# Patient Record
Sex: MALE | Race: WHITE
[De-identification: names, ages, dates, MRNs, and addresses within clinical notes are randomized per-mention and may not be internally consistent; named-entity substitution may affect disease eponyms.]

---

## 2018-01-15 ENCOUNTER — CHARTING TRANS (OUTPATIENT)
Dept: OTHER | Age: 38
End: 2018-01-15

## 2018-09-28 ENCOUNTER — CHARTING TRANS (OUTPATIENT)
Dept: OTHER | Age: 38
End: 2018-09-28

## 2018-11-02 VITALS
RESPIRATION RATE: 18 BRPM | WEIGHT: 245 LBS | OXYGEN SATURATION: 94 % | BODY MASS INDEX: 33.18 KG/M2 | DIASTOLIC BLOOD PRESSURE: 90 MMHG | HEIGHT: 72 IN | HEART RATE: 104 BPM | TEMPERATURE: 98.1 F | SYSTOLIC BLOOD PRESSURE: 130 MMHG

## 2018-11-12 ENCOUNTER — OFFICE VISIT (OUTPATIENT)
Dept: INTERNAL MEDICINE CLINIC | Facility: CLINIC | Age: 38
End: 2018-11-12
Payer: COMMERCIAL

## 2018-11-12 VITALS
SYSTOLIC BLOOD PRESSURE: 142 MMHG | RESPIRATION RATE: 16 BRPM | HEART RATE: 88 BPM | WEIGHT: 256 LBS | BODY MASS INDEX: 34.67 KG/M2 | DIASTOLIC BLOOD PRESSURE: 98 MMHG | HEIGHT: 72 IN | TEMPERATURE: 98 F

## 2018-11-12 DIAGNOSIS — Z00.00 PHYSICAL EXAM, ANNUAL: ICD-10-CM

## 2018-11-12 DIAGNOSIS — R22.31 MASS OF ARM, RIGHT: ICD-10-CM

## 2018-11-12 DIAGNOSIS — R42 VERTIGO: ICD-10-CM

## 2018-11-12 DIAGNOSIS — R21 RASH: Primary | ICD-10-CM

## 2018-11-12 PROCEDURE — 99203 OFFICE O/P NEW LOW 30 MIN: CPT | Performed by: INTERNAL MEDICINE

## 2018-11-12 RX ORDER — CLOTRIMAZOLE AND BETAMETHASONE DIPROPIONATE 10; .64 MG/G; MG/G
1 CREAM TOPICAL 2 TIMES DAILY
Qty: 45 G | Refills: 0 | Status: SHIPPED | OUTPATIENT
Start: 2018-11-12 | End: 2019-05-13
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
## 2018-11-27 VITALS
OXYGEN SATURATION: 97 % | RESPIRATION RATE: 18 BRPM | SYSTOLIC BLOOD PRESSURE: 130 MMHG | HEIGHT: 72 IN | HEART RATE: 84 BPM | TEMPERATURE: 98.5 F | DIASTOLIC BLOOD PRESSURE: 90 MMHG | BODY MASS INDEX: 33.86 KG/M2 | WEIGHT: 250 LBS

## 2018-11-29 ENCOUNTER — TELEPHONE (OUTPATIENT)
Dept: INTERNAL MEDICINE CLINIC | Facility: CLINIC | Age: 38
End: 2018-11-29

## 2018-11-29 NOTE — TELEPHONE ENCOUNTER
Patient coming in for appointment Monday, will need to get Dr. Daniella Wilkes information then. Is aware Dr. Ly Cazares was not in his network.

## 2018-12-03 ENCOUNTER — OFFICE VISIT (OUTPATIENT)
Dept: INTERNAL MEDICINE CLINIC | Facility: CLINIC | Age: 38
End: 2018-12-03
Payer: COMMERCIAL

## 2018-12-03 VITALS
RESPIRATION RATE: 16 BRPM | HEART RATE: 90 BPM | TEMPERATURE: 98 F | WEIGHT: 256.19 LBS | SYSTOLIC BLOOD PRESSURE: 148 MMHG | BODY MASS INDEX: 34.7 KG/M2 | DIASTOLIC BLOOD PRESSURE: 96 MMHG | OXYGEN SATURATION: 96 % | HEIGHT: 72 IN

## 2018-12-03 DIAGNOSIS — Z11.3 SCREENING FOR STD (SEXUALLY TRANSMITTED DISEASE): ICD-10-CM

## 2018-12-03 DIAGNOSIS — R03.0 ELEVATED BLOOD PRESSURE READING: ICD-10-CM

## 2018-12-03 DIAGNOSIS — Z00.00 PHYSICAL EXAM, ANNUAL: Primary | ICD-10-CM

## 2018-12-03 PROCEDURE — 99395 PREV VISIT EST AGE 18-39: CPT | Performed by: INTERNAL MEDICINE

## 2018-12-03 RX ORDER — HYDROCHLOROTHIAZIDE 25 MG/1
25 TABLET ORAL DAILY
Qty: 30 TABLET | Refills: 1 | Status: SHIPPED | OUTPATIENT
Start: 2018-12-03 | End: 2019-01-07

## 2018-12-03 NOTE — PROGRESS NOTES
[facility]    CHIEF COMPLAINT: Patient presents with:  Physical: Patient wanting to go over bloodwork results, Overall feels well colonoscopy-no record of          HPI:   Bj Newman is a 45year old male who presents for a complete physi pain,denies heartburn  : denies dysuria, nocturia, no penile discharge   MUSCULOSKELETAL: denies back pain  NEURO: denies headaches  PSYCHE: denies depression or anxiety  HEMATOLOGIC: denies hx of anemia  ENDOCRINE: denies thyroid history  ALL/ASTHMA: de Component Value Date/Time    A1C 5.5 11/14/2018 10:11 AM      Vitamin D:    No results found for: VITD        ASSESSMENT AND PLAN:   Teresita Tovar is a 45year old male who presents for a complete physical exam.   1. Physical exam, annual  Labs rev

## 2018-12-14 ENCOUNTER — WALK IN (OUTPATIENT)
Dept: URGENT CARE | Age: 38
End: 2018-12-14

## 2018-12-14 VITALS
OXYGEN SATURATION: 95 % | SYSTOLIC BLOOD PRESSURE: 146 MMHG | BODY MASS INDEX: 33.86 KG/M2 | HEART RATE: 93 BPM | HEIGHT: 72 IN | DIASTOLIC BLOOD PRESSURE: 80 MMHG | WEIGHT: 250 LBS | RESPIRATION RATE: 18 BRPM | TEMPERATURE: 98.8 F

## 2018-12-14 DIAGNOSIS — J06.9 VIRAL UPPER RESPIRATORY TRACT INFECTION: Primary | ICD-10-CM

## 2018-12-14 LAB
INTERNAL PROCEDURAL CONTROLS ACCEPTABLE: YES
S PYO AG THROAT QL IA.RAPID: NEGATIVE

## 2018-12-14 PROCEDURE — 87880 STREP A ASSAY W/OPTIC: CPT | Performed by: NURSE PRACTITIONER

## 2018-12-14 PROCEDURE — 99203 OFFICE O/P NEW LOW 30 MIN: CPT | Performed by: NURSE PRACTITIONER

## 2018-12-14 RX ORDER — HYDROCHLOROTHIAZIDE 25 MG/1
25 TABLET ORAL DAILY
COMMUNITY

## 2018-12-14 ASSESSMENT — ENCOUNTER SYMPTOMS
SWOLLEN GLANDS: 1
GASTROINTESTINAL NEGATIVE: 1
SHORTNESS OF BREATH: 0
WHEEZING: 0
ENDOCRINE NEGATIVE: 1
SORE THROAT: 1
TROUBLE SWALLOWING: 0
ALLERGIC/IMMUNOLOGIC NEGATIVE: 1
NEUROLOGICAL NEGATIVE: 1
PSYCHIATRIC NEGATIVE: 1
HEMATOLOGIC/LYMPHATIC NEGATIVE: 1
CONSTITUTIONAL NEGATIVE: 1
COUGH: 1
EYE REDNESS: 1

## 2018-12-17 ENCOUNTER — HOSPITAL ENCOUNTER (OUTPATIENT)
Age: 38
Discharge: HOME | End: 2018-12-17
Attending: FAMILY MEDICINE
Payer: COMMERCIAL

## 2018-12-17 VITALS
HEART RATE: 88 BPM | SYSTOLIC BLOOD PRESSURE: 137 MMHG | RESPIRATION RATE: 20 BRPM | DIASTOLIC BLOOD PRESSURE: 93 MMHG | TEMPERATURE: 98 F | OXYGEN SATURATION: 94 %

## 2018-12-17 DIAGNOSIS — J20.9 BRONCHITIS WITH BRONCHOSPASM: Primary | ICD-10-CM

## 2018-12-17 PROCEDURE — 99214 OFFICE O/P EST MOD 30 MIN: CPT

## 2018-12-17 PROCEDURE — 99204 OFFICE O/P NEW MOD 45 MIN: CPT

## 2018-12-17 PROCEDURE — 94664 DEMO&/EVAL PT USE INHALER: CPT

## 2018-12-17 PROCEDURE — 94640 AIRWAY INHALATION TREATMENT: CPT

## 2018-12-17 RX ORDER — AZITHROMYCIN 250 MG/1
TABLET, FILM COATED ORAL
Qty: 1 PACKAGE | Refills: 0 | Status: SHIPPED | OUTPATIENT
Start: 2018-12-17 | End: 2018-12-22

## 2018-12-17 RX ORDER — METHYLPREDNISOLONE 4 MG/1
TABLET ORAL
Qty: 1 PACKAGE | Refills: 0 | Status: SHIPPED | OUTPATIENT
Start: 2018-12-17 | End: 2018-12-23

## 2018-12-17 RX ORDER — IPRATROPIUM BROMIDE AND ALBUTEROL SULFATE 2.5; .5 MG/3ML; MG/3ML
3 SOLUTION RESPIRATORY (INHALATION) ONCE
Status: COMPLETED | OUTPATIENT
Start: 2018-12-17 | End: 2018-12-17

## 2018-12-17 RX ORDER — ALBUTEROL SULFATE 90 UG/1
2 AEROSOL, METERED RESPIRATORY (INHALATION) EVERY 4 HOURS PRN
Qty: 1 INHALER | Refills: 0 | Status: SHIPPED | OUTPATIENT
Start: 2018-12-17 | End: 2019-01-07

## 2018-12-17 RX ORDER — BENZONATATE 100 MG/1
100 CAPSULE ORAL 3 TIMES DAILY PRN
Qty: 15 CAPSULE | Refills: 0 | Status: SHIPPED | OUTPATIENT
Start: 2018-12-17 | End: 2018-12-22

## 2018-12-17 NOTE — ED PROVIDER NOTES
Patient Seen in: [facility]    History   Patient presents with:  Cough/URI  Redness    Stated Complaint: coughing red eyes nose congestion     HPI  This is a 43-year-old male coming in with complaints of swollen glands, nasal co HEAD: Normocephalic, atraumatic  EENT: OP - wnl, moist, TMs - middle ear effusion +,  Nares normal  NECK: FROM, supple, anterior cervical LAD +   LUNGS: Diminished breath sounds over the lower lung fields, good effort but not as much movement of air as e of this use fragrance free vaseline to apply over the nasal septal mucosa)  · Daily antihistamine - Claritin OR Zyrtec in the AM (non-drowsy) and take Benadryl 25 mg at night time  · Avoid Sudafed (pseudoephedrine or phenylephrine) if you have heart proble Wheezing., Normal, Disp-1 Inhaler, R-0    benzonatate (TESSALON PERLES) 100 MG Oral Cap  Take 1 capsule (100 mg total) by mouth 3 (three) times daily as needed for cough., Normal, Disp-15 capsule, R-0    azithromycin (ZITHROMAX Z-RIANNA) 250 MG Oral Tab  500
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
## 2019-01-07 ENCOUNTER — OFFICE VISIT (OUTPATIENT)
Dept: INTERNAL MEDICINE CLINIC | Facility: CLINIC | Age: 39
End: 2019-01-07
Payer: COMMERCIAL

## 2019-01-07 ENCOUNTER — TELEPHONE (OUTPATIENT)
Dept: INTERNAL MEDICINE CLINIC | Facility: CLINIC | Age: 39
End: 2019-01-07

## 2019-01-07 VITALS
RESPIRATION RATE: 12 BRPM | HEIGHT: 72 IN | TEMPERATURE: 98 F | DIASTOLIC BLOOD PRESSURE: 96 MMHG | SYSTOLIC BLOOD PRESSURE: 142 MMHG | HEART RATE: 101 BPM | BODY MASS INDEX: 33.95 KG/M2 | WEIGHT: 250.69 LBS

## 2019-01-07 DIAGNOSIS — R03.0 ELEVATED BLOOD PRESSURE READING: ICD-10-CM

## 2019-01-07 PROCEDURE — 99213 OFFICE O/P EST LOW 20 MIN: CPT | Performed by: INTERNAL MEDICINE

## 2019-01-07 RX ORDER — HYDROCHLOROTHIAZIDE 25 MG/1
25 TABLET ORAL DAILY
Qty: 30 TABLET | Refills: 0 | Status: SHIPPED | OUTPATIENT
Start: 2019-01-07 | End: 2019-02-11

## 2019-01-07 NOTE — TELEPHONE ENCOUNTER
Patient was just in office to see Dr. Carlos Manuel Cano. At check out patient asked that pharmacy be changed in system to one closer to his house. Patient would like the RX that was written today for hydrochlorothiazide be sent to the [facility] in [place] [address]  Wendi Ferro

## 2019-01-07 NOTE — TELEPHONE ENCOUNTER
Called the patient and informed him that since the prescription is at another [facility], they can just transfer the prescription to the location where he wants to pick it up.  Verified with the patient that he would like his permanent pharmacy noemi

## 2019-01-07 NOTE — PROGRESS NOTES
[facility]    CHIEF COMPLAINT:  Patient presents with:  Blood Pressure        HISTORY OF PRESENT ILLNESS:  Here for bp check. bp still elevated here in office.  Readings at home are generally under 140/90 but only has a couple of readings to claire NEUTROPHILS 58.5 %    LYMPHOCYTES 30.2 %    MONOCYTES 8.8 %    EOSINOPHILS 1.5 %    BASOPHILS 1.0 %   COMP METABOLIC PANEL (14)   Result Value Ref Range    GLUCOSE 98 65 - 99 mg/dL    UREA NITROGEN (BUN) 14 7 - 25 mg/dL    CREATININE 0.80 0.60 - 1.35 mg/dL

## 2019-02-05 LAB
BUN: 14 MG/DL (ref 7–25)
CALCIUM: 9.5 MG/DL (ref 8.6–10.3)
CARBON DIOXIDE: 24 MMOL/L (ref 20–32)
CHLORIDE: 104 MMOL/L (ref 98–110)
CREATININE: 0.9 MG/DL (ref 0.6–1.35)
EGFR IF AFRICN AM: 125 ML/MIN/1.73M2
EGFR IF NONAFRICN AM: 108 ML/MIN/1.73M2
GLUCOSE: 96 MG/DL (ref 65–139)
POTASSIUM: 4.4 MMOL/L (ref 3.5–5.3)
SODIUM: 137 MMOL/L (ref 135–146)

## 2019-02-11 ENCOUNTER — OFFICE VISIT (OUTPATIENT)
Dept: INTERNAL MEDICINE CLINIC | Facility: CLINIC | Age: 39
End: 2019-02-11
Payer: COMMERCIAL

## 2019-02-11 VITALS
WEIGHT: 251.63 LBS | BODY MASS INDEX: 34.08 KG/M2 | HEIGHT: 72 IN | HEART RATE: 93 BPM | DIASTOLIC BLOOD PRESSURE: 92 MMHG | TEMPERATURE: 98 F | SYSTOLIC BLOOD PRESSURE: 130 MMHG

## 2019-02-11 DIAGNOSIS — R22.31 MASS OF ARM, RIGHT: ICD-10-CM

## 2019-02-11 DIAGNOSIS — R03.0 ELEVATED BLOOD PRESSURE READING: ICD-10-CM

## 2019-02-11 DIAGNOSIS — M54.9 UPPER BACK PAIN: ICD-10-CM

## 2019-02-11 PROCEDURE — 99214 OFFICE O/P EST MOD 30 MIN: CPT | Performed by: INTERNAL MEDICINE

## 2019-02-11 RX ORDER — HYDROCHLOROTHIAZIDE 25 MG/1
25 TABLET ORAL DAILY
Qty: 30 TABLET | Refills: 2 | Status: SHIPPED | OUTPATIENT
Start: 2019-02-11 | End: 2019-06-17

## 2019-02-11 NOTE — PROGRESS NOTES
[facility]    CHIEF COMPLAINT:  Patient presents with:  Blood Pressure        HISTORY OF PRESENT ILLNESS:  Here for bp check. bp is still elevated in the office. Has been under 130/80 at home when he checks it.  High readings on his cuff here to AMERICAN 125 > OR = 60 mL/min/1.73m2    BUN/CREATININE RATIO NOT APPLICABLE 6 - 22 (calc)    SODIUM 137 135 - 146 mmol/L    POTASSIUM 4.4 3.5 - 5.3 mmol/L    CHLORIDE 104 98 - 110 mmol/L    CARBON DIOXIDE 24 20 - 32 mmol/L    CALCIUM 9.5 8.6 - 10.3 mg/dL

## 2019-03-04 ENCOUNTER — OFFICE VISIT (OUTPATIENT)
Dept: SURGERY | Facility: CLINIC | Age: 39
End: 2019-03-04
Payer: COMMERCIAL

## 2019-03-04 VITALS
HEART RATE: 98 BPM | BODY MASS INDEX: 34 KG/M2 | HEIGHT: 72 IN | DIASTOLIC BLOOD PRESSURE: 87 MMHG | SYSTOLIC BLOOD PRESSURE: 145 MMHG | WEIGHT: 251 LBS

## 2019-03-04 DIAGNOSIS — D17.21 LIPOMA OF RIGHT UPPER EXTREMITY: Primary | ICD-10-CM

## 2019-03-04 PROCEDURE — 99243 OFF/OP CNSLTJ NEW/EST LOW 30: CPT | Performed by: SURGERY

## 2019-03-04 NOTE — H&P
New Patient Visit Note       Active Problems      1. Lipoma of right upper extremity        Chief Complaint   Patient presents with:  Lump: new pt. referred by Dr. Cira Mckeon - forearm lipoma. PT has had lipoma for a couple years.        History of Present Illnes 45 g, Rfl: 0      Review of Systems  The Review of Systems has been reviewed by me during today. Review of Systems   Constitutional: Negative for chills, diaphoresis, fatigue, fever and unexpected weight change.    HENT: Negative for hearing loss, noseblee Panel (14)      EKG 12 Lead      Imaging & Referrals   EKG 12-LEAD    Follow Up  No Follow-up on file.     Pinky Ortega MD

## 2019-03-06 ENCOUNTER — TELEPHONE (OUTPATIENT)
Dept: SURGERY | Facility: CLINIC | Age: 39
End: 2019-03-06

## 2019-05-13 ENCOUNTER — OFFICE VISIT (OUTPATIENT)
Dept: INTERNAL MEDICINE CLINIC | Facility: CLINIC | Age: 39
End: 2019-05-13
Payer: COMMERCIAL

## 2019-05-13 VITALS
BODY MASS INDEX: 33.98 KG/M2 | WEIGHT: 250.88 LBS | DIASTOLIC BLOOD PRESSURE: 98 MMHG | HEART RATE: 96 BPM | RESPIRATION RATE: 12 BRPM | TEMPERATURE: 97 F | HEIGHT: 72 IN | SYSTOLIC BLOOD PRESSURE: 140 MMHG

## 2019-05-13 DIAGNOSIS — I10 ESSENTIAL HYPERTENSION: Primary | ICD-10-CM

## 2019-05-13 PROCEDURE — 99213 OFFICE O/P EST LOW 20 MIN: CPT | Performed by: INTERNAL MEDICINE

## 2019-05-13 RX ORDER — LISINOPRIL 5 MG/1
5 TABLET ORAL DAILY
Qty: 30 TABLET | Refills: 0 | Status: SHIPPED | OUTPATIENT
Start: 2019-05-13 | End: 2019-06-03

## 2019-05-13 NOTE — PROGRESS NOTES
[facility]    CHIEF COMPLAINT:  Patient presents with:  Blood Pressure        HISTORY OF PRESENT ILLNESS:  Here for follow up and bp check. bp still elevated. Reading at home today was 125/74. Taking hctz regularly.  Did not bring in any other r cuff and readings to next visit. - lisinopril 5 MG Oral Tab; Take 1 tablet (5 mg total) by mouth daily. Dispense: 30 tablet; Refill: 0        No orders of the defined types were placed in this encounter. Return to clinic in 2 weeks for bp check.

## 2019-06-03 ENCOUNTER — OFFICE VISIT (OUTPATIENT)
Dept: INTERNAL MEDICINE CLINIC | Facility: CLINIC | Age: 39
End: 2019-06-03
Payer: COMMERCIAL

## 2019-06-03 VITALS
TEMPERATURE: 98 F | SYSTOLIC BLOOD PRESSURE: 148 MMHG | RESPIRATION RATE: 12 BRPM | WEIGHT: 249 LBS | HEIGHT: 72 IN | BODY MASS INDEX: 33.72 KG/M2 | DIASTOLIC BLOOD PRESSURE: 92 MMHG | HEART RATE: 96 BPM

## 2019-06-03 DIAGNOSIS — I10 ESSENTIAL HYPERTENSION: ICD-10-CM

## 2019-06-03 PROCEDURE — 99213 OFFICE O/P EST LOW 20 MIN: CPT | Performed by: INTERNAL MEDICINE

## 2019-06-03 RX ORDER — LISINOPRIL 10 MG/1
10 TABLET ORAL DAILY
Qty: 30 TABLET | Refills: 0 | Status: SHIPPED | OUTPATIENT
Start: 2019-06-03 | End: 2019-06-17

## 2019-06-03 NOTE — PROGRESS NOTES
[facility]    CHIEF COMPLAINT:  Patient presents with:  Blood Pressure        HISTORY OF PRESENT ILLNESS:  Here for bp check. Htn: on hctz and lisinopril. Tolerating well. Reading in office is still elevated. Readings at home are 125-135/75-85. study. He does not feel well rested in the am but denies daytime fatigue or somnolence. Does not know if he snores. Has a relatively smaller airway. - lisinopril 10 MG Oral Tab; Take 1 tablet (10 mg total) by mouth daily. Dispense: 30 tablet;  Refill

## 2019-06-17 ENCOUNTER — OFFICE VISIT (OUTPATIENT)
Dept: INTERNAL MEDICINE CLINIC | Facility: CLINIC | Age: 39
End: 2019-06-17
Payer: COMMERCIAL

## 2019-06-17 VITALS
HEART RATE: 99 BPM | WEIGHT: 250.5 LBS | BODY MASS INDEX: 33.93 KG/M2 | DIASTOLIC BLOOD PRESSURE: 84 MMHG | TEMPERATURE: 98 F | RESPIRATION RATE: 12 BRPM | HEIGHT: 72 IN | SYSTOLIC BLOOD PRESSURE: 130 MMHG

## 2019-06-17 DIAGNOSIS — I10 ESSENTIAL HYPERTENSION: ICD-10-CM

## 2019-06-17 DIAGNOSIS — E78.2 MIXED HYPERLIPIDEMIA: Primary | ICD-10-CM

## 2019-06-17 PROCEDURE — 99213 OFFICE O/P EST LOW 20 MIN: CPT | Performed by: INTERNAL MEDICINE

## 2019-06-17 RX ORDER — LISINOPRIL 10 MG/1
10 TABLET ORAL DAILY
Qty: 30 TABLET | Refills: 2 | Status: SHIPPED | OUTPATIENT
Start: 2019-06-17 | End: 2020-03-27 | Stop reason: SINTOL

## 2019-06-17 RX ORDER — HYDROCHLOROTHIAZIDE 25 MG/1
25 TABLET ORAL DAILY
Qty: 30 TABLET | Refills: 2 | Status: SHIPPED | OUTPATIENT
Start: 2019-06-17 | End: 2020-03-27

## 2019-06-17 NOTE — PROGRESS NOTES
[facility]    CHIEF COMPLAINT:  Patient presents with:  Blood Pressure        HISTORY OF PRESENT ILLNESS:  Here for bp check. bp at home is well controlled on this regimen. 110-120/70-80. No chest pain, no shortness of breath.  No lightheadednes hypertension  Continue hctz and lisinopril. Will check sleep study. - hydrochlorothiazide 25 MG Oral Tab; Take 1 tablet (25 mg total) by mouth daily. Dispense: 30 tablet;  Refill: 2  - OP REFERRAL TO DIAGNOSTIC SLEEP STUDY  - GENERAL SLEEP STUDY TRANS

## 2020-03-27 ENCOUNTER — TELEPHONE (OUTPATIENT)
Dept: INTERNAL MEDICINE CLINIC | Facility: CLINIC | Age: 40
End: 2020-03-27

## 2020-03-27 DIAGNOSIS — Z13.29 SCREENING FOR THYROID DISORDER: Primary | ICD-10-CM

## 2020-03-27 DIAGNOSIS — Z13.0 SCREENING FOR BLOOD DISEASE: ICD-10-CM

## 2020-03-27 DIAGNOSIS — E78.2 MIXED HYPERLIPIDEMIA: ICD-10-CM

## 2020-03-27 NOTE — TELEPHONE ENCOUNTER
Labs re-ordered to [facility].  TS completed. Pt advised to call in advance if develops any symptoms or comes in contact with possibly infected individual. Advised of door protocol on entry.

## 2020-03-27 NOTE — TELEPHONE ENCOUNTER
I would like him to do a cbc, cmp, lipid, tsh. As long as his screening questions are negative he can see me on Monday for bp check. Make appt in the am after 9am. Do labs prior to visit.

## 2020-03-27 NOTE — TELEPHONE ENCOUNTER
Spoke with pt. Pt did not follow up due to needing to catch up on medical bills. Was taking medication but starting coughing persistently, making it hard to work. He stopped 1 med, coughing persisted, so stopped the other.  He did not recall the order i

## 2020-03-27 NOTE — TELEPHONE ENCOUNTER
Pt has appt scheduled for 3/30/2020. He has not done labs ordered in 6/2019. He did not come back in 2 weeks for bp check as we had asked him to. Medications have not been refilled since that time and so he is likely not taking his medications regularly.

## 2020-03-29 LAB
ABSOLUTE BASOPHILS: 67 CELLS/UL (ref 0–200)
ABSOLUTE EOSINOPHILS: 107 CELLS/UL (ref 15–500)
ABSOLUTE LYMPHOCYTES: 1802 CELLS/UL (ref 850–3900)
ABSOLUTE MONOCYTES: 570 CELLS/UL (ref 200–950)
ABSOLUTE NEUTROPHILS: 4154 CELLS/UL (ref 1500–7800)
ALBUMIN/GLOBULIN RATIO: 1.4 (CALC) (ref 1–2.5)
ALBUMIN: 4.3 G/DL (ref 3.6–5.1)
ALKALINE PHOSPHATASE: 74 U/L (ref 36–130)
ALT: 44 U/L (ref 9–46)
AST: 23 U/L (ref 10–40)
BASOPHILS: 1 %
BILIRUBIN, TOTAL: 0.6 MG/DL (ref 0.2–1.2)
BUN: 16 MG/DL (ref 7–25)
CALCIUM: 9.4 MG/DL (ref 8.6–10.3)
CARBON DIOXIDE: 24 MMOL/L (ref 20–32)
CHLORIDE: 105 MMOL/L (ref 98–110)
CHOL/HDLC RATIO: 5.3 (CALC)
CHOLESTEROL, TOTAL: 255 MG/DL
CREATININE: 0.85 MG/DL (ref 0.6–1.35)
EGFR IF AFRICN AM: 127 ML/MIN/1.73M2
EGFR IF NONAFRICN AM: 110 ML/MIN/1.73M2
EOSINOPHILS: 1.6 %
GLOBULIN: 3.1 G/DL (CALC) (ref 1.9–3.7)
GLUCOSE: 97 MG/DL (ref 65–99)
HDL CHOLESTEROL: 48 MG/DL
HEMATOCRIT: 46.9 % (ref 38.5–50)
HEMOGLOBIN: 16.3 G/DL (ref 13.2–17.1)
LDL-CHOLESTEROL: 171 MG/DL (CALC)
LYMPHOCYTES: 26.9 %
MCH: 31.2 PG (ref 27–33)
MCHC: 34.8 G/DL (ref 32–36)
MCV: 89.8 FL (ref 80–100)
MONOCYTES: 8.5 %
MPV: 9 FL (ref 7.5–12.5)
NEUTROPHILS: 62 %
NON-HDL CHOLESTEROL: 207 MG/DL (CALC)
PLATELET COUNT: 309 THOUSAND/UL (ref 140–400)
POTASSIUM: 4.4 MMOL/L (ref 3.5–5.3)
PROTEIN, TOTAL: 7.4 G/DL (ref 6.1–8.1)
RDW: 12.3 % (ref 11–15)
RED BLOOD CELL COUNT: 5.22 MILLION/UL (ref 4.2–5.8)
SODIUM: 138 MMOL/L (ref 135–146)
TRIGLYCERIDES: 197 MG/DL
TSH W/REFLEX TO FT4: 2.01 MIU/L (ref 0.4–4.5)
WHITE BLOOD CELL COUNT: 6.7 THOUSAND/UL (ref 3.8–10.8)

## 2020-03-30 NOTE — TELEPHONE ENCOUNTER
FYI, patient cancelled 3/30/20 apt on my chart. Rescheduled apt on 4/20/20 for \"Test results\"  Please advise if needed, or FYI.

## 2020-03-30 NOTE — TELEPHONE ENCOUNTER
Noted. Pt cancelled appt and rescheduled for next month. 

## 2020-04-20 NOTE — TELEPHONE ENCOUNTER
Left msg for pt to call back. To schedule TV for BP and cholesterol. Encourage to keep appt.     Per Dr Mae Erath result note:  Notes recorded by Uzma Oviedo MD on 4/20/2020 at 8:47 AM CDT  Pt cancelled his appt on 3/30 and rescheduled for 4/20/2020 and th

## 2021-06-04 ENCOUNTER — TELEPHONE (OUTPATIENT)
Dept: INTERNAL MEDICINE CLINIC | Facility: CLINIC | Age: 41
End: 2021-06-04

## 2021-12-28 ENCOUNTER — IMMUNIZATION (OUTPATIENT)
Dept: LAB | Facility: HOSPITAL | Age: 41
End: 2021-12-28
Attending: EMERGENCY MEDICINE
Payer: COMMERCIAL

## 2021-12-28 DIAGNOSIS — Z23 NEED FOR VACCINATION: Primary | ICD-10-CM

## 2021-12-28 PROCEDURE — 0011A SARSCOV2 VAC 100MCG/0.5ML IM: CPT

## 2022-01-25 ENCOUNTER — IMMUNIZATION (OUTPATIENT)
Dept: LAB | Facility: HOSPITAL | Age: 42
End: 2022-01-25
Attending: EMERGENCY MEDICINE
Payer: COMMERCIAL

## 2022-01-25 DIAGNOSIS — Z23 NEED FOR VACCINATION: Primary | ICD-10-CM

## 2022-01-25 PROCEDURE — 0012A SARSCOV2 VAC 100MCG/0.5ML IM: CPT

## 2022-02-04 ENCOUNTER — HOSPITAL ENCOUNTER (EMERGENCY)
Facility: HOSPITAL | Age: 42
Discharge: HOME | End: 2022-02-04
Attending: EMERGENCY MEDICINE
Payer: COMMERCIAL

## 2022-02-04 ENCOUNTER — APPOINTMENT (OUTPATIENT)
Dept: CT IMAGING | Facility: HOSPITAL | Age: 42
End: 2022-02-04
Attending: EMERGENCY MEDICINE
Payer: COMMERCIAL

## 2022-02-04 VITALS
OXYGEN SATURATION: 99 % | RESPIRATION RATE: 18 BRPM | TEMPERATURE: 98 F | DIASTOLIC BLOOD PRESSURE: 89 MMHG | SYSTOLIC BLOOD PRESSURE: 162 MMHG | HEART RATE: 77 BPM

## 2022-02-04 VITALS
BODY MASS INDEX: 33.86 KG/M2 | OXYGEN SATURATION: 97 % | TEMPERATURE: 98 F | RESPIRATION RATE: 18 BRPM | SYSTOLIC BLOOD PRESSURE: 164 MMHG | WEIGHT: 250 LBS | HEART RATE: 93 BPM | HEIGHT: 72 IN | DIASTOLIC BLOOD PRESSURE: 105 MMHG

## 2022-02-04 DIAGNOSIS — M54.2 NECK PAIN: Primary | ICD-10-CM

## 2022-02-04 DIAGNOSIS — I10 ESSENTIAL HYPERTENSION: ICD-10-CM
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
## 2022-02-04 DIAGNOSIS — M54.2 NECK PAIN: ICD-10-CM
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
## 2022-02-04 PROCEDURE — 99285 EMERGENCY DEPT VISIT HI MDM: CPT

## 2022-02-04 PROCEDURE — 72125 CT NECK SPINE W/O DYE: CPT | Performed by: EMERGENCY MEDICINE

## 2022-02-04 PROCEDURE — 99283 EMERGENCY DEPT VISIT LOW MDM: CPT

## 2022-02-04 PROCEDURE — 99284 EMERGENCY DEPT VISIT MOD MDM: CPT

## 2022-02-04 RX ORDER — IBUPROFEN 600 MG/1
600 TABLET ORAL ONCE
Status: COMPLETED | OUTPATIENT
Start: 2022-02-04 | End: 2022-02-04

## 2022-02-04 RX ORDER — CYCLOBENZAPRINE HCL 10 MG
10 TABLET ORAL 3 TIMES DAILY PRN
Qty: 10 TABLET | Refills: 0 | Status: SHIPPED | OUTPATIENT
Start: 2022-02-04 | End: 2022-02-11

## 2022-02-04 RX ORDER — IBUPROFEN 600 MG/1
600 TABLET ORAL EVERY 8 HOURS PRN
Qty: 30 TABLET | Refills: 0 | Status: SHIPPED | OUTPATIENT
Start: 2022-02-04 | End: 2022-02-11
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
## 2022-02-28 ENCOUNTER — PATIENT OUTREACH (OUTPATIENT)
Dept: INTERNAL MEDICINE CLINIC | Facility: CLINIC | Age: 42
End: 2022-02-28

## 2022-03-09 ENCOUNTER — TELEPHONE (OUTPATIENT)
Dept: INTERNAL MEDICINE CLINIC | Facility: CLINIC | Age: 42
End: 2022-03-09

## 2022-04-07 NOTE — TELEPHONE ENCOUNTER
See TE dated 2/28/22. Sent message thru [address] 3rd contact attempt made forwarding to [facility] to mail letter.

## 2022-07-26 ENCOUNTER — OFFICE VISIT (OUTPATIENT)
Dept: INTERNAL MEDICINE CLINIC | Facility: CLINIC | Age: 42
End: 2022-07-26
Payer: COMMERCIAL

## 2022-07-26 VITALS
RESPIRATION RATE: 16 BRPM | TEMPERATURE: 97 F | DIASTOLIC BLOOD PRESSURE: 86 MMHG | HEIGHT: 71.75 IN | WEIGHT: 256.38 LBS | SYSTOLIC BLOOD PRESSURE: 150 MMHG | BODY MASS INDEX: 35.11 KG/M2 | HEART RATE: 94 BPM

## 2022-07-26 DIAGNOSIS — I10 ESSENTIAL HYPERTENSION: ICD-10-CM

## 2022-07-26 DIAGNOSIS — Z00.00 PHYSICAL EXAM, ANNUAL: Primary | ICD-10-CM

## 2022-07-26 DIAGNOSIS — R21 RASH: ICD-10-CM

## 2022-07-26 PROCEDURE — 3079F DIAST BP 80-89 MM HG: CPT | Performed by: INTERNAL MEDICINE

## 2022-07-26 PROCEDURE — 3008F BODY MASS INDEX DOCD: CPT | Performed by: INTERNAL MEDICINE

## 2022-07-26 PROCEDURE — 99214 OFFICE O/P EST MOD 30 MIN: CPT | Performed by: INTERNAL MEDICINE

## 2022-07-26 PROCEDURE — 99396 PREV VISIT EST AGE 40-64: CPT | Performed by: INTERNAL MEDICINE

## 2022-07-26 PROCEDURE — 3077F SYST BP >= 140 MM HG: CPT | Performed by: INTERNAL MEDICINE

## 2022-07-26 RX ORDER — HYDROCHLOROTHIAZIDE 25 MG/1
25 TABLET ORAL DAILY
Qty: 90 TABLET | Refills: 0 | Status: SHIPPED | OUTPATIENT
Start: 2022-07-26

## 2022-07-26 RX ORDER — CLOTRIMAZOLE AND BETAMETHASONE DIPROPIONATE 10; .64 MG/G; MG/G
1 CREAM TOPICAL 2 TIMES DAILY
Qty: 45 G | Refills: 0 | Status: SHIPPED | OUTPATIENT
Start: 2022-07-26

## 2022-07-26 RX ORDER — LISINOPRIL 10 MG/1
10 TABLET ORAL DAILY
Qty: 90 TABLET | Refills: 0 | Status: SHIPPED | OUTPATIENT
Start: 2022-07-26

## 2022-08-10 LAB
ABSOLUTE BASOPHILS: 51 CELLS/UL (ref 0–200)
ABSOLUTE EOSINOPHILS: 83 CELLS/UL (ref 15–500)
ABSOLUTE LYMPHOCYTES: 1958 CELLS/UL (ref 850–3900)
ABSOLUTE MONOCYTES: 621 CELLS/UL (ref 200–950)
ABSOLUTE NEUTROPHILS: 3686 CELLS/UL (ref 1500–7800)
ALBUMIN/GLOBULIN RATIO: 1.5 (CALC) (ref 1–2.5)
ALBUMIN: 4.5 G/DL (ref 3.6–5.1)
ALKALINE PHOSPHATASE: 76 U/L (ref 36–130)
ALT: 28 U/L (ref 9–46)
AST: 19 U/L (ref 10–40)
BASOPHILS: 0.8 %
BILIRUBIN, TOTAL: 0.5 MG/DL (ref 0.2–1.2)
BUN: 20 MG/DL (ref 7–25)
CALCIUM: 9.7 MG/DL (ref 8.6–10.3)
CARBON DIOXIDE: 27 MMOL/L (ref 20–32)
CHLORIDE: 101 MMOL/L (ref 98–110)
CHOL/HDLC RATIO: 4.9 (CALC)
CHOLESTEROL, TOTAL: 239 MG/DL
CREATININE: 1 MG/DL (ref 0.6–1.29)
EGFR: 96 ML/MIN/1.73M2
EOSINOPHILS: 1.3 %
GLOBULIN: 3 G/DL (CALC) (ref 1.9–3.7)
GLUCOSE: 98 MG/DL (ref 65–99)
HDL CHOLESTEROL: 49 MG/DL
HEMATOCRIT: 44.5 % (ref 38.5–50)
HEMOGLOBIN A1C: 5.5 % OF TOTAL HGB
HEMOGLOBIN: 14.9 G/DL (ref 13.2–17.1)
LDL-CHOLESTEROL: 164 MG/DL (CALC)
LYMPHOCYTES: 30.6 %
MCH: 30.4 PG (ref 27–33)
MCHC: 33.5 G/DL (ref 32–36)
MCV: 90.8 FL (ref 80–100)
MONOCYTES: 9.7 %
MPV: 9.2 FL (ref 7.5–12.5)
NEUTROPHILS: 57.6 %
NON-HDL CHOLESTEROL: 190 MG/DL (CALC)
PLATELET COUNT: 351 THOUSAND/UL (ref 140–400)
POTASSIUM: 4.2 MMOL/L (ref 3.5–5.3)
PROTEIN, TOTAL: 7.5 G/DL (ref 6.1–8.1)
RDW: 12.3 % (ref 11–15)
RED BLOOD CELL COUNT: 4.9 MILLION/UL (ref 4.2–5.8)
SODIUM: 136 MMOL/L (ref 135–146)
TRIGLYCERIDES: 123 MG/DL
TSH W/REFLEX TO FT4: 2.59 MIU/L (ref 0.4–4.5)
WHITE BLOOD CELL COUNT: 6.4 THOUSAND/UL (ref 3.8–10.8)

## 2022-08-30 ENCOUNTER — TELEPHONE (OUTPATIENT)
Dept: INTERNAL MEDICINE CLINIC | Facility: CLINIC | Age: 42
End: 2022-08-30

## 2022-08-30 NOTE — TELEPHONE ENCOUNTER
Pt calling back and said that his BP sometimes is low as 85/56 and 85/62 and sometimes he gets light-headed . Asking if he can discontinue hydrochlorothiazide?  Please advise

## 2022-08-30 NOTE — TELEPHONE ENCOUNTER
Okay to hold hydrochlorothiazide. Continue lisinopril. Need to check bp at home and bring me readings to the next visit. Reach out to the office if readings consistently higher than 140/90.    Please update med list.

## 2022-08-30 NOTE — TELEPHONE ENCOUNTER
Pt calling to ask Dr. Suni Segura if he can stop taking HTCZ because it's causing him light-headedness and dropping his blood pressure to low. Pt says he will continue to take Lisinopril. Please advise. Thank you.

## 2022-08-31 NOTE — TELEPHONE ENCOUNTER
Med list updated   Left detailed message per hipaa informing him of recommendations below   Advised to call back with any additional questions or concerns

## 2022-09-21 ENCOUNTER — OFFICE VISIT (OUTPATIENT)
Dept: FAMILY MEDICINE CLINIC | Facility: CLINIC | Age: 42
End: 2022-09-21

## 2022-09-21 VITALS
BODY MASS INDEX: 33.86 KG/M2 | HEIGHT: 72 IN | WEIGHT: 250 LBS | OXYGEN SATURATION: 98 % | TEMPERATURE: 98 F | HEART RATE: 90 BPM | RESPIRATION RATE: 18 BRPM

## 2022-09-21 DIAGNOSIS — J06.9 VIRAL UPPER RESPIRATORY TRACT INFECTION: ICD-10-CM

## 2022-09-21 DIAGNOSIS — J02.9 SORE THROAT: Primary | ICD-10-CM

## 2022-09-21 LAB
CONTROL LINE PRESENT WITH A CLEAR BACKGROUND (YES/NO): YES YES/NO
KIT LOT #: 2554 NUMERIC
STREP GRP A CUL-SCR: NEGATIVE

## 2022-09-21 PROCEDURE — 99213 OFFICE O/P EST LOW 20 MIN: CPT | Performed by: FAMILY MEDICINE

## 2022-09-21 PROCEDURE — 87880 STREP A ASSAY W/OPTIC: CPT | Performed by: FAMILY MEDICINE

## 2022-09-21 PROCEDURE — 3008F BODY MASS INDEX DOCD: CPT | Performed by: FAMILY MEDICINE

## 2022-09-21 NOTE — PATIENT INSTRUCTIONS
Your COVID test will be back in 24 hours. Use OTC meds for comfort as needed--  Ibuprofen/Tylenol for fever/pain  Use Benadryl at bedtime to reduce drainage and promote rest.  Zyrtec/Claritin/Allegra in the AM to reduce nasal drainage without sedation. Use saline nasal sprays to reduce congestion and thin secretions. Use Delsym for cough. Consider applying linda's vapo-rub or eucayptus oil to chest and feet at bedtime to reduce chest and nasal congestion. Warm tea with honey, cough lozenges, vaporizers/steam etc.    If no better in 2-3 days or if new symptoms develop, follow-up with your PCP for further evaluation.

## 2022-09-22 LAB — SARS-COV-2 RNA RESP QL NAA+PROBE: NOT DETECTED

## 2024-04-26 ENCOUNTER — TELEPHONE (OUTPATIENT)
Dept: INTERNAL MEDICINE CLINIC | Facility: CLINIC | Age: 44
End: 2024-04-26

## 2024-06-28 NOTE — TELEPHONE ENCOUNTER
New referral to the [facility] for INR/Warfarin management.  Patient is to start Warfarin today then check an INR on 7/1/24 (3 days).  Left message for patient, asked for a call back to discuss.  Contact number given.    Patient states they will look at their schedule and call back to schedule a PE

## (undated) NOTE — LETTER
Date & Time: 2/4/2022, 10:23 AM  Patient: Evan Gonzáles  Encounter Provider(s):    Brigitte Hackett DO       To Whom It May Concern:    Hugh Spears was seen and treated in our department on 2/4/2022. He can return to work on 2/5/2022.     If you have any questions or concerns, please do not hesitate to call.        _____________________________  Physician/APC Signature

## (undated) NOTE — Clinical Note
I had the pleasure of seeing Mr. Angel Baca in my office today. Please see my attached note.     Cortney Cervantes

## (undated) NOTE — LETTER
08/19/19        [address]  [place] [address]      Dear Olivia Moctezuma records indicate that you have outstanding lab work and or testing that was ordered for you and has not yet been completed:        Basic Metabolic Panel

## (undated) NOTE — LETTER
5/15/2024      Chris Herzog  [address]  [facility] [address]      Dear Chris Herzog,      We have not seen you in our office for quite some time. After careful review of your medical record it has come to our attention that you are due for an appointment. We take each of our patient's health very seriously and the key to maintaining your health is to routinely follow-up as planned with your providers. We are reaching out to confirm if you would like to still receive care from this office. If so, we do recommend a visit annually. If you have found a new primary care provider please contact our office to update our records.    Please contact our office as soon as possible to schedule an appointment at [phone].  If you've activated your [employer] account, you also have access to self-schedule online as well.  If you have any questions, please do not hesitate to contact our office directly.      Thank you,      [facility] ([facility])  [address] [facility] [address] [place] [id number]  Phone: [phone]  Fax: [phone]

## (undated) NOTE — LETTER
01/02/19        [address] [place] [address]      Dear Sherrie Taylor records indicate that you have outstanding lab work and or testing that was ordered for you and has not yet been completed:  Orders Placed This Encounte

## (undated) NOTE — LETTER
4/12/2022      Evan Gonzáles  [address]  [address]    5/16/1980      Dear Evan Gonzáles,      After careful review of your medical record it has come to our attention that you are due for your Annual Physical.     We take each of our patient's health very seriously and the key to maintaining your health is to routinely follow-up as planned with your providers. Please contact our office as soon as possible to schedule an appointment at [phone]. If you've activated your [employer] account, you also have access to self-schedule online as well. If you have any questions, please do not hesitate to contact our office directly.       Thank you,      Dr. Kade Quinteros MD   [address] [place] [address]  Phone: [phone]  Fax: [phone]